# Patient Record
Sex: FEMALE | Race: BLACK OR AFRICAN AMERICAN
[De-identification: names, ages, dates, MRNs, and addresses within clinical notes are randomized per-mention and may not be internally consistent; named-entity substitution may affect disease eponyms.]

---

## 2019-07-20 ENCOUNTER — HOSPITAL ENCOUNTER (EMERGENCY)
Dept: HOSPITAL 62 - ER | Age: 24
LOS: 1 days | Discharge: HOME | End: 2019-07-21
Payer: SELF-PAY
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
## 2019-07-20 DIAGNOSIS — S82.852A: Primary | ICD-10-CM

## 2019-07-20 PROCEDURE — 96375 TX/PRO/DX INJ NEW DRUG ADDON: CPT

## 2019-07-20 PROCEDURE — 27788 TREATMENT OF ANKLE FRACTURE: CPT

## 2019-07-20 PROCEDURE — 73610 X-RAY EXAM OF ANKLE: CPT

## 2019-07-20 PROCEDURE — 99152 MOD SED SAME PHYS/QHP 5/>YRS: CPT

## 2019-07-20 PROCEDURE — 96374 THER/PROPH/DIAG INJ IV PUSH: CPT

## 2019-07-20 PROCEDURE — 73600 X-RAY EXAM OF ANKLE: CPT

## 2019-07-20 PROCEDURE — 99283 EMERGENCY DEPT VISIT LOW MDM: CPT

## 2019-07-20 NOTE — ER DOCUMENT REPORT
ED General





- General


Chief Complaint: Ankle Injury


Stated Complaint: ANKLE INJURY


Time Seen by Provider: 07/20/19 21:22





- [facility]


Notes: 





Patient is a 23-year-old female who presents to the emergency department for 

evaluation after a left ankle injury.  She was rollerskating for the first time.

 She fell and went to get up, someone fell on her left lower extremity.  She 

really cannot elaborate for me which side.  She denies any pain anywhere besides

her ankle.  She did not hit her head.  No neck or back pain.  She is never had a

broken bone, has never seen an orthopedist in the past.





- Related Data


Allergies/Adverse Reactions: 


                                        





No Known Allergies Allergy (Unverified 07/20/19 21:21)


   











Past Medical History





- General


Information source: Patient





- Social History


Smoking Status: Never Smoker


Frequency of alcohol use: Rare


Family History: Reviewed & Not Pertinent





- Medical History


Medical History: Negative





Review of Systems





- Review of Systems


Constitutional: No symptoms reported


EENT: No symptoms reported


Cardiovascular: No symptoms reported


Respiratory: No symptoms reported


Gastrointestinal: No symptoms reported


Genitourinary: No symptoms reported


Musculoskeletal: No symptoms reported


Skin: No symptoms reported


Neurological/Psychological: No symptoms reported





Physical Exam





- Vital signs


Vitals: 


                                        











Resp BP Pulse Ox


 


 17   131/93 H  100 


 


 07/20/19 21:21  07/20/19 21:21  07/20/19 21:21











Notes: 





Vital signs per EMS reveal a blood pressure 108/71, heart rate 95, O2 sats 97% 

on room air, respiratory rate of 16, nonlabored





- Notes


Notes: 





Vital signs reviewed, please refer to chart.  This is a 23-year-old female who 

appears her stated age in a moderate amount of distress.  She is tearful.  Head 

is normocephalic, atraumatic.  Pupils equal round, reactive to light.  Neck is 

supple without meningismus.  Heart is regular rate and rhythm.  Lungs are clear 

to auscultation bilaterally.  Abdomen is soft, nontender, normoactive bowel 

sounds throughout.  Examination of the left lower extremity yields obvious 

deformity at the ankle.  She is tenderness palpation of the posterior aspect of 

both the lateral medial malleoli.  No fifth metatarsal head tenderness.  No 

fibular head tenderness.  Skin is intact.  Neurovascularly intact to left lower 

extremity.





Course





- Re-evaluation


Re-evalutation: 





07/20/19 21:35


Patient presents emergency department for evaluation.  She was administered 

fentanyl in route with little relief.  We will give her morphine.  She has 

obvious fracture bilateral malleoli.  Awaiting radiology read.  Based on 

radiology read


07/20/19 22:57


, Patient's level of discomfort, no apparent deformity, decision was made to 

proceed with reduction.  Conscious sedation was used.  The procedure was 

explained in great detail, questions were sought and answered, consent was 

signed and placed on the chart.  Procedure performed without difficulty, please 

see attached procedure note.  Postprocedural reduction films ordered, pending at

this time.


07/21/19 00:43


Postreduction films show significantly improved alignment.  I was concerned 

about outpatient follow-up in this patient, as I do not have orthopedist on call

this evening.  I spoke with [place].  I was put in touch with orthopedist 

on-call, Dr. Nathaniel Hernandez.  He is able to evaluate images.  He believes that 

outpatient follow-up is appropriate at this time.  Patient was given crutches.  

We will send her home with 6 Norco to go, as well as a prescription.  She is 

instructed to take over-the-counter ibuprofen as needed for moderate pain.  She 

is given contact information for Dr. Hernandez.  She is to return to the 

emergency department with worsening or new concerning symptoms of any sort.


07/21/19 01:20


Patient given a slip for 1 week off of work.  She currently works in a .





- Vital Signs


Vital signs: 


                                        











Temp Pulse Resp BP Pulse Ox


 


 98.2 F   105 H  21 H  124/76   100 


 


 07/20/19 21:42  07/20/19 23:08  07/20/19 23:10  07/20/19 23:10  07/20/19 23:10














- Diagnostic Test


Radiology reviewed: Image reviewed, Reports reviewed


Radiology results interpreted by me: 





07/21/19 00:44





                                        





Ankle X-Ray  07/20/19 00:00


IMPRESSION:


 


Comminuted fracture involving the distal fibular diaphysis with


lateral displacement of the distal fracture fragments.


 


Transversely oriented fracture of the medial malleolus.


 


Lateral subluxation of the tibiotalar joint.


 


Possible posterior malleolus fracture.


 


 


copyright 2011 [employer]- All Rights Reserved


 








Ankle X-Ray  07/20/19 22:56


IMPRESSION:


 


Ankle in post reduction and casting as detailed above.


 


 


copyright 2011 [employer]- All Rights Reserved


 














Procedures





- Joint Reduction/Fracture Care


  ** Left Ankle


Time completed: 22:48


Consent obtained: Yes


Conscious sedation: Yes


Pre-procedure NV exam: Yes


Fracture: Closed


Manipulation comment: Easily manipulated to near anatomic alignment


Post-procedure NV exam: Yes - Neurovascularly intact


Complications: No


Notes: 





07/20/19 23:00


Procedure was explained in great detail.  Questions were sought and answered.  

Consent was signed and placed on the chart.  The patient was medicated with 

propofol, a total of 80 mg, given in 2 doses of 60 and 20 respectively.  Once 

adequate analgesia was achieved, traction and varus stress were placed on the 

ankle to reduce.  Once near anatomical alignment was achieved grossly, 

neurovascular status was assessed.  Good capillary refill, dorsalis pedis pulse 

2+.  Heavy padding was placed, then OCL was placed for posterior and sugar tong.

 Again neurovascularly intact following splint placement.  Awaiting 

postreduction films.





Discharge





- Discharge


Clinical Impression: 


Closed left trimalleolar fracture


Qualifiers:


 Encounter type: initial encounter Qualified Code(s): S82.852A - Displaced 

trimalleolar fracture of left lower leg, initial encounter for closed fracture





Condition: Stable


Disposition: HOME, SELF-CARE


Instructions:  Use of Crutches ([facility]), Fractured Ankle (Bimalleolar) ([facility]), 

Temporary Splint ([facility])


Additional Instructions: 


You have a trimalleolar fracture of your ankle.  This will require further 

orthopedic follow-up.  Use crutches for ambulation, absolutely no weightbearing.

 Take Norco as needed for severe pain, ibuprofen for moderate pain.  Do not get 

splint wet, and keep it in place until follow-up with orthopedics.  You can 

follow-up with Dr. Nathaniel Hernandez, of [facility].  Call his office at 

[phone] on Monday morning for a follow-up appointment this week.  If you 

develop increased pain, significant welling, numbness, or any other new or 

concerning symptoms, return immediately to the emergency department for 

reevaluation.


Prescriptions: 


Hydrocodone/Acetaminophen [Norco 5-325 mg Tablet] 1 tab PO Q6H PRN #10 tablet


 PRN Reason: For Pain Scale 4-5


Forms:  Return to Work

## 2019-07-20 NOTE — RADIOLOGY REPORT (SQ)
EXAM DESCRIPTION: 



XR ANKLE 3 OR MORE VIEWS



COMPLETED DATE/TME:  07/20/2019 00:00



CLINICAL HISTORY: 



23 years, Female, PAIN, INJURY



COMPARISON:

None.



NUMBER OF VIEWS:

Three



TECHNIQUE:

Frontal, oblique, and lateral radiographs were obtained.



LIMITATIONS:

None.



FINDINGS:



Visualized is a comminuted fracture involving the distal fibular

diaphysis with lateral displacement of the distal fracture

fragments. There is a concomitant transversely oriented fracture

through the medial malleolus. In addition, there is lateral

subluxation of the talus relative to the tibia. There is also

likely deformity involving the posterior malleolus. Focal

sclerotic area located within the posterior aspect of the

calcaneus possibly corresponds to a benign bone island.



IMPRESSION:



Comminuted fracture involving the distal fibular diaphysis with

lateral displacement of the distal fracture fragments.



Transversely oriented fracture of the medial malleolus.



Lateral subluxation of the tibiotalar joint.



Possible posterior malleolus fracture.

 



copyright 2011 [employer]- All Rights Reserved

## 2019-07-20 NOTE — RADIOLOGY REPORT (SQ)
EXAM DESCRIPTION: 



XR ANKLE 2 VIEWS



COMPLETED DATE/TME:  07/20/2019 22:56



CLINICAL HISTORY: 



23 years, Female, post reduction



COMPARISON:

7/20/2019.



NUMBER OF VIEWS:

2



TECHNIQUE:

Two views of the LEFT ankle were obtained in post reduction and

casting in AP and crosstable lateral projection.



LIMITATIONS:

None.



FINDINGS:



Overlying cast material obscures fine osseous detail. Frontal

appearance of the ankle reveals gross anatomic alignment of the

medial malleolus fracture. The tibiotalar joint space appears to

be grossly aligned. Again identified is stable appearance of a

multipart comminuted fracture of the distal fibular diaphysis.



IMPRESSION:



Ankle in post reduction and casting as detailed above.

 



copyright 2011 [employer]- All Rights Reserved

## 2019-07-21 VITALS — SYSTOLIC BLOOD PRESSURE: 122 MMHG | DIASTOLIC BLOOD PRESSURE: 69 MMHG
